# Patient Record
Sex: FEMALE | Race: BLACK OR AFRICAN AMERICAN | Employment: STUDENT | URBAN - METROPOLITAN AREA
[De-identification: names, ages, dates, MRNs, and addresses within clinical notes are randomized per-mention and may not be internally consistent; named-entity substitution may affect disease eponyms.]

---

## 2017-01-02 ENCOUNTER — HOSPITAL ENCOUNTER (EMERGENCY)
Age: 22
Discharge: HOME | End: 2017-01-02
Attending: INTERNAL MEDICINE
Payer: SELF-PAY

## 2017-01-02 ENCOUNTER — APPOINTMENT (OUTPATIENT)
Dept: GENERAL RADIOLOGY | Age: 22
End: 2017-01-02
Attending: PHYSICIAN ASSISTANT
Payer: SELF-PAY

## 2017-01-02 VITALS
OXYGEN SATURATION: 100 % | RESPIRATION RATE: 20 BRPM | HEIGHT: 60 IN | TEMPERATURE: 97.9 F | DIASTOLIC BLOOD PRESSURE: 60 MMHG | BODY MASS INDEX: 26.5 KG/M2 | HEART RATE: 64 BPM | SYSTOLIC BLOOD PRESSURE: 118 MMHG | WEIGHT: 135 LBS

## 2017-01-02 DIAGNOSIS — L73.9 FOLLICULITIS: ICD-10-CM

## 2017-01-02 DIAGNOSIS — N39.0 URINARY TRACT INFECTION WITHOUT HEMATURIA, SITE UNSPECIFIED: ICD-10-CM

## 2017-01-02 DIAGNOSIS — R51.9 SCALP PAIN: Primary | ICD-10-CM

## 2017-01-02 LAB
ANION GAP BLD CALC-SCNC: 16 MMOL/L (ref 5–15)
APPEARANCE UR: CLEAR
BACTERIA URNS QL MICRO: ABNORMAL /HPF
BILIRUB UR QL: NEGATIVE
BUN BLD-MCNC: 12 MG/DL (ref 9–20)
CA-I BLD-MCNC: 1.26 MMOL/L (ref 1.12–1.32)
CHLORIDE BLD-SCNC: 103 MMOL/L (ref 98–107)
CO2 BLD-SCNC: 26 MMOL/L (ref 21–32)
COLOR UR: ABNORMAL
CREAT BLD-MCNC: 0.6 MG/DL (ref 0.6–1.3)
EPITH CASTS URNS QL MICRO: ABNORMAL /LPF
GLUCOSE BLD-MCNC: 83 MG/DL (ref 65–105)
GLUCOSE UR STRIP.AUTO-MCNC: NEGATIVE MG/DL
HCG UR QL: NEGATIVE
HCT VFR BLD CALC: 38 % (ref 35–47)
HGB BLD-MCNC: 12.9 GM/DL (ref 11.5–16)
HGB UR QL STRIP: NEGATIVE
KETONES UR QL STRIP.AUTO: NEGATIVE MG/DL
LEUKOCYTE ESTERASE UR QL STRIP.AUTO: NEGATIVE
NITRITE UR QL STRIP.AUTO: NEGATIVE
PH UR STRIP: 6.5 [PH] (ref 5–8)
POTASSIUM BLD-SCNC: 4.2 MMOL/L (ref 3.5–5.1)
PROT UR STRIP-MCNC: NEGATIVE MG/DL
RBC #/AREA URNS HPF: ABNORMAL /HPF (ref 0–5)
SERVICE CMNT-IMP: ABNORMAL
SODIUM BLD-SCNC: 140 MMOL/L (ref 136–145)
SP GR UR REFRACTOMETRY: 1.02 (ref 1–1.03)
TROPONIN I BLD-MCNC: <0.04 NG/ML (ref 0–0.08)
UA: UC IF INDICATED,UAUC: ABNORMAL
UROBILINOGEN UR QL STRIP.AUTO: 1 EU/DL (ref 0.2–1)
WBC URNS QL MICRO: ABNORMAL /HPF (ref 0–4)

## 2017-01-02 PROCEDURE — 93005 ELECTROCARDIOGRAM TRACING: CPT

## 2017-01-02 PROCEDURE — 74011250636 HC RX REV CODE- 250/636: Performed by: PHYSICIAN ASSISTANT

## 2017-01-02 PROCEDURE — 87086 URINE CULTURE/COLONY COUNT: CPT | Performed by: INTERNAL MEDICINE

## 2017-01-02 PROCEDURE — 81001 URINALYSIS AUTO W/SCOPE: CPT | Performed by: INTERNAL MEDICINE

## 2017-01-02 PROCEDURE — 71020 XR CHEST PA LAT: CPT

## 2017-01-02 PROCEDURE — 80047 BASIC METABLC PNL IONIZED CA: CPT

## 2017-01-02 PROCEDURE — 81025 URINE PREGNANCY TEST: CPT

## 2017-01-02 PROCEDURE — 99285 EMERGENCY DEPT VISIT HI MDM: CPT

## 2017-01-02 PROCEDURE — 96372 THER/PROPH/DIAG INJ SC/IM: CPT

## 2017-01-02 PROCEDURE — 84484 ASSAY OF TROPONIN QUANT: CPT

## 2017-01-02 RX ORDER — KETOROLAC TROMETHAMINE 30 MG/ML
30 INJECTION, SOLUTION INTRAMUSCULAR; INTRAVENOUS
Status: COMPLETED | OUTPATIENT
Start: 2017-01-02 | End: 2017-01-02

## 2017-01-02 RX ORDER — SULFAMETHOXAZOLE AND TRIMETHOPRIM 200; 40 MG/5ML; MG/5ML
20 SUSPENSION ORAL 2 TIMES DAILY
Qty: 120 ML | Refills: 0 | Status: SHIPPED | OUTPATIENT
Start: 2017-01-02 | End: 2017-01-05

## 2017-01-02 RX ORDER — SODIUM CHLORIDE 0.9 % (FLUSH) 0.9 %
5-10 SYRINGE (ML) INJECTION AS NEEDED
Status: DISCONTINUED | OUTPATIENT
Start: 2017-01-02 | End: 2017-01-02 | Stop reason: HOSPADM

## 2017-01-02 RX ORDER — TRIPROLIDINE/PSEUDOEPHEDRINE 2.5MG-60MG
200 TABLET ORAL
Qty: 1 BOTTLE | Refills: 0 | Status: SHIPPED | OUTPATIENT
Start: 2017-01-02

## 2017-01-02 RX ORDER — SODIUM CHLORIDE 0.9 % (FLUSH) 0.9 %
5-10 SYRINGE (ML) INJECTION EVERY 8 HOURS
Status: DISCONTINUED | OUTPATIENT
Start: 2017-01-02 | End: 2017-01-02 | Stop reason: HOSPADM

## 2017-01-02 RX ADMIN — KETOROLAC TROMETHAMINE 30 MG: 30 INJECTION, SOLUTION INTRAMUSCULAR; INTRAVENOUS at 18:48

## 2017-01-02 NOTE — ED NOTES

## 2017-01-02 NOTE — ED PROVIDER NOTES
Patient is a 25 y.o. female presenting with headaches and chest pain. The history is provided by the patient. Headache    This is a new (Pt reports posterior headache x 3 days. Denies vision changes, fever, photophobia, worst HA of life, numbness/tingling. ) problem. The current episode started more than 2 days ago. The problem occurs constantly. The problem has not changed since onset. The headache is aggravated by activity (bending over. ). The pain is located in the occipital region. The quality of the pain is described as dull. The pain is at a severity of 7/10. Associated symptoms include chest pressure. Pertinent negatives include no anorexia, no fever, no malaise/fatigue, no near-syncope, no orthopnea, no palpitations, no syncope, no shortness of breath, no weakness, no tingling, no dizziness, no visual change, no nausea and no vomiting. Associated symptoms comments: Pt sister w/ hx of migraines. . She has tried nothing for the symptoms. Chest Pain (Angina)    This is a new problem. The current episode started 3 to 5 hours ago. The problem has not changed since onset. The problem occurs constantly. The pain is present in the substernal region. The pain is at a severity of 7/10. The quality of the pain is described as tightness. The pain does not radiate. Associated symptoms include headaches (Pt endorses hx of being tx ofr HA and folliculitis d/t weave. Pt states she did not take Abx because she can't take pills. ). Pertinent negatives include no abdominal pain, no back pain, no claudication, no cough, no diaphoresis, no dizziness, no exertional chest pressure, no fever, no hemoptysis, no irregular heartbeat, no leg pain, no lower extremity edema, no malaise/fatigue, no nausea, no near-syncope, no numbness, no orthopnea, no palpitations, no PND, no shortness of breath, no sputum production, no syncope, no vomiting and no weakness. She has tried nothing for the symptoms. Risk factors include no risk factors. Past Medical History:   Diagnosis Date    Aphthous ulcer 5/12/2011    Asthma     Cyst of mouth 5/12/2011       History reviewed. No pertinent past surgical history. History reviewed. No pertinent family history. Social History     Social History    Marital status: SINGLE     Spouse name: N/A    Number of children: N/A    Years of education: N/A     Occupational History    Not on file. Social History Main Topics    Smoking status: Never Smoker    Smokeless tobacco: Never Used    Alcohol use No    Drug use: No    Sexual activity: No     Other Topics Concern    Not on file     Social History Narrative         ALLERGIES: Review of patient's allergies indicates no known allergies. Review of Systems   Constitutional: Positive for fatigue. Negative for activity change, chills, diaphoresis, fever and malaise/fatigue. HENT: Negative for dental problem, ear pain, facial swelling, sinus pressure and sore throat. Eyes: Positive for photophobia. Negative for pain. Respiratory: Positive for chest tightness. Negative for apnea, cough, hemoptysis, sputum production and shortness of breath. Cardiovascular: Positive for chest pain. Negative for palpitations, orthopnea, claudication, syncope, PND and near-syncope. Gastrointestinal: Negative for abdominal pain, anorexia, diarrhea, nausea and vomiting. Genitourinary: Negative. Musculoskeletal: Negative. Negative for back pain. Skin: Negative. Negative for pallor. Neurological: Positive for headaches (Pt endorses hx of being tx ofr HA and folliculitis d/t weave. Pt states she did not take Abx because she can't take pills. ). Negative for dizziness, tingling, syncope, facial asymmetry, weakness, light-headedness and numbness. Psychiatric/Behavioral: Negative.         Vitals:    01/02/17 1715   BP: 121/57   Pulse: (!) 58   Resp: 18   Temp: 97.9 °F (36.6 °C)   SpO2: 100%   Weight: 61.2 kg (135 lb)   Height: 5' (1.524 m) Physical Exam   Constitutional: She is oriented to person, place, and time. She appears well-developed and well-nourished. No distress. HENT:   Head: Normocephalic and atraumatic. Not macrocephalic and not microcephalic. Head is without raccoon's eyes, without Sanders's sign, without right periorbital erythema and without left periorbital erythema. Right Ear: Hearing and external ear normal.   Left Ear: Hearing and external ear normal.   Nose: Nose normal.   Mouth/Throat: Oropharynx is clear and moist.   TTP on posterior scalp. Pt has weave in hair. Eyes: Conjunctivae and EOM are normal. Pupils are equal, round, and reactive to light. Neck: Normal range of motion. Cardiovascular: Normal rate, regular rhythm, normal heart sounds and intact distal pulses. Exam reveals no gallop and no friction rub. No murmur heard. Pulmonary/Chest: Effort normal and breath sounds normal. No respiratory distress. She has no decreased breath sounds. She has no wheezes. She has no rhonchi. She has no rales. She exhibits no tenderness. Talking in full sentences w/ no SOB noted. Abdominal: Bowel sounds are normal. There is no tenderness. Musculoskeletal: Normal range of motion. Neurological: She is alert and oriented to person, place, and time. No cranial nerve deficit. Skin: Skin is warm and dry. She is not diaphoretic. Psychiatric: She has a normal mood and affect. Her behavior is normal. Judgment and thought content normal.   Nursing note and vitals reviewed.        MDM  Number of Diagnoses or Management Options  Folliculitis:   Scalp pain:   Urinary tract infection without hematuria, site unspecified:   Diagnosis management comments: DDx: Atypical chest pain, musculoskeletal pain, migraine vs tension HA, contusion    LABORATORY TESTS:  Recent Results (from the past 12 hour(s))  -URINALYSIS W/ REFLEX CULTURE  Collection Time: 01/02/17  6:14 PM       Result Value                         Ref Range                       Color                                             YELLOW/STRAW                                                  Appearance                                        CLEAR                         CLEAR                           Specific gravity                                  1.020                         1.003 - 1.030                   pH (UA)                                           6.5                           5.0 - 8.0                       Protein                                           NEGATIVE                      NEG mg/dL                       Glucose                                           NEGATIVE                      NEG mg/dL                       Ketone                                            NEGATIVE                      NEG mg/dL                       Bilirubin                                         NEGATIVE                      NEG                             Blood                                             NEGATIVE                      NEG                             Urobilinogen                                      1.0                           0.2 - 1.0 EU/dL                 Nitrites                                          NEGATIVE                      NEG                             Leukocyte Esterase                                NEGATIVE                      NEG                             WBC                                               0-4                           0 - 4 /hpf                      RBC                                               0-5                           0 - 5 /hpf                      Epithelial cells                                  MANY (A)                      FEW /lpf                        Bacteria                                          1+ (A)                        NEG /hpf                        UA:UC IF INDICATED                                URINE CULTURE ORDERED (A)     CNI -HCG URINE, QL. - POC  Collection Time: 01/02/17  6:18 PM       Result                                            Value                         Ref Range                       Pregnancy test,urine (POC)                        NEGATIVE                      NEG                        -EKG, 12 LEAD, INITIAL  Collection Time: 01/02/17  6:42 PM       Result                                            Value                         Ref Range                       Ventricular Rate                                  61                            BPM                             Atrial Rate                                       61                            BPM                             P-R Interval                                      120                           ms                              QRS Duration                                      80                            ms                              Q-T Interval                                      364                           ms                              QTC Calculation (Bezet)                           366                           ms                              Calculated P Axis                                 62                            degrees                         Calculated R Axis                                 21                            degrees                         Calculated T Axis                                 17                            degrees                         Diagnosis                                                                                                   Normal sinus rhythm Normal ECG No previous ECGs available   -POC CHEM8  Collection Time: 01/02/17  7:01 PM       Result                                            Value                         Ref Range                       Calcium, ionized (POC)                            1.26                          1.12 - 1.32 MMOL/L              Sodium (POC) 140                           136 - 145 MMOL/L                Potassium (POC)                                   4.2                           3.5 - 5.1 MMOL/L                Chloride (POC)                                    103                           98 - 107 MMOL/L                 CO2 (POC)                                         26                            21 - 32 MMOL/L                  Anion gap (POC)                                   16 (H)                        5 - 15 mmol/L                   Glucose (POC)                                     83                            65 - 105 MG/DL                  BUN (POC)                                         12                            9 - 20 MG/DL                    Creatinine (POC)                                  0.6                           0.6 - 1.3 MG/DL                 GFR-AA (POC)                                      >60                           >60 ml/min/1.73m2               GFR, non-AA (POC)                                 >60                           >60 ml/min/1.73m2               Hemoglobin (POC)                                  12.9                          11.5 - 16.0 GM/DL               Hematocrit (POC)                                  38                            35.0 - 47.0 %                   Comment                                           Comment Not Indicated. -POC TROPONIN-I  Collection Time: 01/02/17  7:08 PM       Result                                            Value                         Ref Range                       Troponin-I (POC)                                  <0.04                         0.00 - 0.08 ng/mL            IMAGING RESULTS:  XR CHEST PA LAT   Final Result   FINDINGS:  The lungs are clear. The central airways are patent. The heart size is normal.  No pneumothorax or pleural effusion.     IMPRESSION  IMPRESSION:   Clear lungs.     MEDICATIONS GIVEN:  Medications  sodium chloride (NS) flush 5-10 mL (not administered)  sodium chloride (NS) flush 5-10 mL (not administered)  ketorolac (TORADOL) injection 30 mg (30 mg IntraMUSCular Given 17)    IMPRESSION:  Scalp pain  (primary encounter diagnosis)  Folliculitis  Urinary tract infection without hematuria, site unspecified    PLAN:  1. Current Discharge Medication List    START taking these medications    sulfamethoxazole-trimethoprim (BACTRIM;SEPTRA) 200-40 mg/5 mL suspension  Take 20 mL by mouth two (2) times a day for 3 days. Qty: 120 mL Refills: 0    ibuprofen (ADVIL;MOTRIN) 100 mg/5 mL suspension  Take 10 mL by mouth every six (6) hours as needed. Qty: 1 Bottle Refills: 0      STOP taking these medications    ibuprofen (MOTRIN) 600 mg tablet  Comments:  Reason for Stoppin. Follow-up Information     Follow up With Details Comments 90 Bentley Gallagher MD Schedule an appointment as soon as possible for   a visit in 1 week As needed, If symptoms worsen [address] [place] [id number]  [phone]        Return to ED if worse                  Amount and/or Complexity of Data Reviewed  Clinical lab tests: ordered and reviewed  Tests in the radiology section of CPT®: ordered and reviewed  Tests in the medicine section of CPT®: ordered and reviewed    Patient Progress  Patient progress: stable    ED Course       Procedures      EKG interpretation: (Preliminary)  Rhythm: normal sinus rhythm; and regular . Rate (approx.): 61bpm; Axis: normal; CO interval: normal; QRS interval: normal ; ST/T wave: normal; Other findings: normal.       7:50 PM  I have discussed with patient their diagnosis, treatment, and follow up plan. The patient agrees to follow up as outlined in discharge paperwork and also to return to the ED with any worsening.  Edgard Roblero PA-C

## 2017-01-03 NOTE — ED NOTES
Patient relays \" I am feeling a lot better now, that medication really helped me\". Rating pain 3/10 at this time.

## 2017-01-03 NOTE — ED NOTES
Pt very fearful of needles. Unable to start piv but able to draw blood with one attempt. Ok per leonard garcia, to wait on labs and start piv if pt needs a piv later-lilian night shift rn, was notified.

## 2017-01-03 NOTE — ED NOTES
Discharge Instructions Reviewed with patient per PA. Discharge instructions given to patient per PA. Patient able to return verbalize discharge instructions. Paper copy of discharge instructions given. No RX given. Patient condition stable, Respiratory status WNL, Neurostatus intact.  Ambulatory out of er, to home with friend

## 2017-01-03 NOTE — DISCHARGE INSTRUCTIONS
Folliculitis: Care Instructions  Your Care Instructions    Folliculitis (say \"tvz-IUL-zwq-LY-tus\") is an infection of the pouches (follicles) in the skin where hair grows. It can occur on any part of the body, but it is most common on the scalp, face, armpits, and groin. Bacteria, such as those found in a hot tub, can cause folliculitis. Folliculitis begins as a red, tender area near a strand of hair. The skin can itch or burn and may drain pus or blood. Sometimes folliculitis can lead to more serious skin infections. Your doctor usually can treat mild folliculitis with an antibiotic cream or ointment. If you have folliculitis on your scalp, you may use a shampoo that kills bacteria. Antibiotics you take as pills can treat infections deeper in the skin. For stubborn cases of folliculitis, laser treatment may be an option. Laser treatment uses strong beams of light to destroy the hair follicle. But hair will no longer grow in the treated area. Follow-up care is a key part of your treatment and safety. Be sure to make and go to all appointments, and call your doctor if you are having problems. It's also a good idea to know your test results and keep a list of the medicines you take. How can you care for yourself at home? · Take your medicine exactly as prescribed. If your doctor prescribed antibiotics, take them as directed. Do not stop taking them just because you feel better. You need to take the full course of antibiotics. · Use a soap that kills bacteria to wash the infected area. If your scalp or beard is infected, use a shampoo with selenium or propylene glycol. Be careful. Do not scrub too long or too hard. · Mix 1 1/3 cup warm water and 1 tablespoon vinegar. Soak a cloth in the mixture, and place it over the infected skin until it cools off (usually 5 to 10 minutes). You can do this 3 to 6 times a day. · Do not share your razor, towel, or washcloth. That can spread folliculitis.   · Use a new blade in your razor each time you shave to keep from re-infecting your skin. · If you tend to get folliculitis, avoid using hot tubs. They can contain bacteria that cause folliculitis. When should you call for help? Call your doctor now or seek immediate medical care if:  · You have a fever not caused by the flu or some other known illness. · You have signs of infection such as:  ¨ Pain, warmth, or swelling in the skin. ¨ Red streaks near a hair follicle. ¨ Pus coming from a hair follicle. ¨ A fever. Watch closely for changes in your health, and be sure to contact your doctor if:  · Your home treatment does not help. Where can you learn more? Go to http://michel-tona.info/. Enter M257 in the search box to learn more about \"Folliculitis: Care Instructions. \"  Current as of: February 5, 2016  Content Version: 11.1  © 0104-8085 [employer]. Care instructions adapted under license by [employer] (which disclaims liability or warranty for this information). If you have questions about a medical condition or this instruction, always ask your healthcare professional. [place] [address] any warranty or liability for your use of this information. Urinary Tract Infection in Women: Care Instructions  Your Care Instructions    A urinary tract infection, or UTI, is a general term for an infection anywhere between the kidneys and the urethra (where urine comes out). Most UTIs are bladder infections. They often cause pain or burning when you urinate. UTIs are caused by bacteria and can be cured with antibiotics. Be sure to complete your treatment so that the infection goes away. Follow-up care is a key part of your treatment and safety. Be sure to make and go to all appointments, and call your doctor if you are having problems. It's also a good idea to know your test results and keep a list of the medicines you take.   How can you care for yourself at home?  · Take your antibiotics as directed. Do not stop taking them just because you feel better. You need to take the full course of antibiotics. · Drink extra water and other fluids for the next day or two. This may help wash out the bacteria that are causing the infection. (If you have kidney, heart, or liver disease and have to limit fluids, talk with your doctor before you increase your fluid intake.)  · Avoid drinks that are carbonated or have caffeine. They can irritate the bladder. · Urinate often. Try to empty your bladder each time. · To relieve pain, take a hot bath or lay a heating pad set on low over your lower belly or genital area. Never go to sleep with a heating pad in place. To prevent UTIs  · Drink plenty of water each day. This helps you urinate often, which clears bacteria from your system. (If you have kidney, heart, or liver disease and have to limit fluids, talk with your doctor before you increase your fluid intake.)  · Consider adding cranberry juice to your diet. · Urinate when you need to. · Urinate right after you have sex. · Change sanitary pads often. · Avoid douches, bubble baths, feminine hygiene sprays, and other feminine hygiene products that have deodorants. · After going to the bathroom, wipe from front to back. When should you call for help? Call your doctor now or seek immediate medical care if:  · Symptoms such as fever, chills, nausea, or vomiting get worse or appear for the first time. · You have new pain in your back just below your rib cage. This is called flank pain. · There is new blood or pus in your urine. · You have any problems with your antibiotic medicine. Watch closely for changes in your health, and be sure to contact your doctor if:  · You are not getting better after taking an antibiotic for 2 days. · Your symptoms go away but then come back. Where can you learn more? Go to http://michel-tona.info/.   Enter N853 in the search box to learn more about \"Urinary Tract Infection in Women: Care Instructions. \"  Current as of: August 12, 2016  Content Version: 11.1  © 1703-7332 [employer]. Care instructions adapted under license by [employer] (which disclaims liability or warranty for this information). If you have questions about a medical condition or this instruction, always ask your healthcare professional. [place] [address] any warranty or liability for your use of this information.

## 2017-01-03 NOTE — ED NOTES
Report given to night shift rn, lilian, and care passed on of pt. No s/si of acute distress. Call bell within reach. Pt's friend at bedside.

## 2017-01-04 LAB
ATRIAL RATE: 61 BPM
BACTERIA SPEC CULT: NORMAL
CALCULATED P AXIS, ECG09: 62 DEGREES
CALCULATED R AXIS, ECG10: 21 DEGREES
CALCULATED T AXIS, ECG11: 17 DEGREES
CC UR VC: NORMAL
DIAGNOSIS, 93000: NORMAL
P-R INTERVAL, ECG05: 120 MS
Q-T INTERVAL, ECG07: 364 MS
QRS DURATION, ECG06: 80 MS
QTC CALCULATION (BEZET), ECG08: 366 MS
SERVICE CMNT-IMP: NORMAL
VENTRICULAR RATE, ECG03: 61 BPM